# Patient Record
Sex: FEMALE | Race: WHITE
[De-identification: names, ages, dates, MRNs, and addresses within clinical notes are randomized per-mention and may not be internally consistent; named-entity substitution may affect disease eponyms.]

---

## 2018-07-17 ENCOUNTER — HOSPITAL ENCOUNTER (EMERGENCY)
Dept: HOSPITAL 36 - ER | Age: 48
Discharge: HOME | End: 2018-07-17
Payer: SELF-PAY
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
## 2018-07-17 DIAGNOSIS — Z88.1: ICD-10-CM

## 2018-07-17 DIAGNOSIS — Z98.890: ICD-10-CM
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
## 2018-07-17 DIAGNOSIS — Z88.2: ICD-10-CM
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
## 2018-07-17 DIAGNOSIS — S30.0XXA: Primary | ICD-10-CM

## 2018-07-17 PROCEDURE — 99285 EMERGENCY DEPT VISIT HI MDM: CPT

## 2018-07-17 PROCEDURE — 72220 X-RAY EXAM SACRUM TAILBONE: CPT

## 2018-07-17 PROCEDURE — 81025 URINE PREGNANCY TEST: CPT

## 2018-07-17 PROCEDURE — 96372 THER/PROPH/DIAG INJ SC/IM: CPT
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
## 2018-07-18 NOTE — DIAGNOSTIC IMAGING REPORT
Sacrum and coccyx 3 views



Indication: pain



Comparison: none



Findings: There is slight irregularity of the coccyx.  The SI joints

demonstrate mild degenerative changes.



Impression:



Slight irregularity of the coccyx which may be congenital due to old

trauma.  Acute nondisplaced fracture cannot be excluded.  Please

correlate with clinical findings.



In the setting of trauma, if clinical symptoms persist and there is

continued concern for an occult fracture, follow up exams in 5-7 days is

suggested.